# Patient Record
Sex: FEMALE | Race: BLACK OR AFRICAN AMERICAN
[De-identification: names, ages, dates, MRNs, and addresses within clinical notes are randomized per-mention and may not be internally consistent; named-entity substitution may affect disease eponyms.]

---

## 2017-01-17 ENCOUNTER — HOSPITAL ENCOUNTER (OUTPATIENT)
Dept: HOSPITAL 45 - C.LABSPEC | Age: 32
Discharge: HOME | End: 2017-01-17
Attending: OBSTETRICS & GYNECOLOGY
Payer: COMMERCIAL

## 2017-01-17 DIAGNOSIS — N92.5: ICD-10-CM

## 2017-01-17 DIAGNOSIS — R10.2: Primary | ICD-10-CM

## 2017-01-21 LAB
CHLAMYDIA TRACH RNA***: NOT DETECTED
GC (NEIS GONORRHOEAE)RNA**: NOT DETECTED

## 2017-09-25 ENCOUNTER — HOSPITAL ENCOUNTER (OUTPATIENT)
Dept: HOSPITAL 45 - C.EDA | Age: 32
Setting detail: OBSERVATION
LOS: 1 days | Discharge: HOME | End: 2017-09-26
Attending: SURGERY | Admitting: SURGERY
Payer: COMMERCIAL

## 2017-09-25 VITALS
OXYGEN SATURATION: 98 % | TEMPERATURE: 98.78 F | HEART RATE: 84 BPM | SYSTOLIC BLOOD PRESSURE: 106 MMHG | DIASTOLIC BLOOD PRESSURE: 66 MMHG

## 2017-09-25 VITALS
OXYGEN SATURATION: 100 % | TEMPERATURE: 99.14 F | SYSTOLIC BLOOD PRESSURE: 111 MMHG | DIASTOLIC BLOOD PRESSURE: 74 MMHG | HEART RATE: 61 BPM

## 2017-09-25 VITALS
HEIGHT: 69 IN | HEIGHT: 69 IN | BODY MASS INDEX: 28.6 KG/M2 | BODY MASS INDEX: 28.6 KG/M2 | WEIGHT: 193.12 LBS | WEIGHT: 193.12 LBS

## 2017-09-25 VITALS — HEART RATE: 50 BPM | OXYGEN SATURATION: 93 % | SYSTOLIC BLOOD PRESSURE: 137 MMHG | DIASTOLIC BLOOD PRESSURE: 89 MMHG

## 2017-09-25 VITALS — OXYGEN SATURATION: 99 % | SYSTOLIC BLOOD PRESSURE: 128 MMHG | HEART RATE: 75 BPM | DIASTOLIC BLOOD PRESSURE: 77 MMHG

## 2017-09-25 VITALS
TEMPERATURE: 97.7 F | OXYGEN SATURATION: 100 % | DIASTOLIC BLOOD PRESSURE: 76 MMHG | SYSTOLIC BLOOD PRESSURE: 123 MMHG | HEART RATE: 61 BPM

## 2017-09-25 VITALS — SYSTOLIC BLOOD PRESSURE: 119 MMHG | DIASTOLIC BLOOD PRESSURE: 80 MMHG | OXYGEN SATURATION: 96 % | HEART RATE: 56 BPM

## 2017-09-25 VITALS
SYSTOLIC BLOOD PRESSURE: 128 MMHG | TEMPERATURE: 97.52 F | DIASTOLIC BLOOD PRESSURE: 84 MMHG | HEART RATE: 53 BPM | OXYGEN SATURATION: 100 %

## 2017-09-25 VITALS — OXYGEN SATURATION: 100 %

## 2017-09-25 DIAGNOSIS — Z82.49: ICD-10-CM

## 2017-09-25 DIAGNOSIS — K35.80: Primary | ICD-10-CM

## 2017-09-25 DIAGNOSIS — Z83.3: ICD-10-CM

## 2017-09-25 DIAGNOSIS — F90.9: ICD-10-CM

## 2017-09-25 DIAGNOSIS — F17.200: ICD-10-CM

## 2017-09-25 LAB
ALP SERPL-CCNC: 76 U/L (ref 45–117)
ALT SERPL-CCNC: 23 U/L (ref 12–78)
ANION GAP SERPL CALC-SCNC: 10 MMOL/L (ref 3–11)
APPEARANCE UR: CLEAR
AST SERPL-CCNC: 26 U/L (ref 15–37)
BASOPHILS # BLD: 0.01 K/UL (ref 0–0.2)
BASOPHILS NFR BLD: 0.1 %
BILIRUB UR-MCNC: (no result) MG/DL
BUN SERPL-MCNC: 16 MG/DL (ref 7–18)
BUN/CREAT SERPL: 19.5 (ref 10–20)
CALCIUM SERPL-MCNC: 9 MG/DL (ref 8.5–10.1)
CHLORIDE SERPL-SCNC: 107 MMOL/L (ref 98–107)
CO2 SERPL-SCNC: 21 MMOL/L (ref 21–32)
COLOR UR: YELLOW
COMPLETE: YES
CREAT CL PREDICTED SERPL C-G-VRATE: 115.6 ML/MIN
CREAT SERPL-MCNC: 0.84 MG/DL (ref 0.6–1.2)
EOSINOPHIL NFR BLD AUTO: 256 K/UL (ref 130–400)
GLUCOSE SERPL-MCNC: 93 MG/DL (ref 70–99)
HCT VFR BLD CALC: 37.1 % (ref 37–47)
IG%: 0.2 %
IMM GRANULOCYTES NFR BLD AUTO: 23 %
LYMPHOCYTES # BLD: 2.11 K/UL (ref 1.2–3.4)
MANUAL MICROSCOPIC REQUIRED?: NO
MCH RBC QN AUTO: 29.9 PG (ref 25–34)
MCHC RBC AUTO-ENTMCNC: 33.4 G/DL (ref 32–36)
MCV RBC AUTO: 89.4 FL (ref 80–100)
MONOCYTES NFR BLD: 5.6 %
NEUTROPHILS # BLD AUTO: 0.7 %
NEUTROPHILS NFR BLD AUTO: 70.4 %
NITRITE UR QL STRIP: (no result)
PH UR STRIP: 6.5 [PH] (ref 4.5–7.5)
PMV BLD AUTO: 10.2 FL (ref 7.4–10.4)
POTASSIUM SERPL-SCNC: 3.7 MMOL/L (ref 3.5–5.1)
PREG INTERNAL NEGATIVE QC: (no result)
PREG INTERNAL POSITIVE QC: (no result)
RBC # BLD AUTO: 4.15 M/UL (ref 4.2–5.4)
REVIEW REQ?: YES
SODIUM SERPL-SCNC: 138 MMOL/L (ref 136–145)
SP GR UR STRIP: 1.01 (ref 1–1.03)
URINE BILL WITH OR WITHOUT MIC: (no result)
URINE EPITHELIAL CELL AUTO: >30 /LPF (ref 0–5)
UROBILINOGEN UR-MCNC: (no result) MG/DL
WBC # BLD AUTO: 9.16 K/UL (ref 4.8–10.8)
ZZUR CULT IF INDIC CLEAN CATCH: YES

## 2017-09-25 RX ADMIN — OXYCODONE HYDROCHLORIDE AND ACETAMINOPHEN PRN TAB: 5; 325 TABLET ORAL at 19:39

## 2017-09-25 RX ADMIN — DEXTROSE MONOHYDRATE, SODIUM CHLORIDE, AND POTASSIUM CHLORIDE SCH MLS/HR: 50; 4.5; 1.49 INJECTION, SOLUTION INTRAVENOUS at 14:27

## 2017-09-25 RX ADMIN — OXYCODONE HYDROCHLORIDE AND ACETAMINOPHEN PRN TAB: 5; 325 TABLET ORAL at 14:31

## 2017-09-25 RX ADMIN — DEXTROSE MONOHYDRATE, SODIUM CHLORIDE, AND POTASSIUM CHLORIDE SCH MLS/HR: 50; 4.5; 1.49 INJECTION, SOLUTION INTRAVENOUS at 23:23

## 2017-09-25 NOTE — [facility] POST OPERATIVE BRIEF NOTE
Immediate Operative Summary


Operative Date


Sep 25, 2017.





Pre-Operative Diagnosis





Acute Appendicitis





Post-Operative Diagnosis





Acute Appendicitis





Procedure(s) Performed





Laparoscopic Appendectomy





Surgeon


Dr. Jameson





Assistant Surgeon(s)


Aleksandra Moura PA-C





Estimated Blood Loss


5 ml





Findings


See dictation





Specimens





A: Appendix





Drains


None





Anesthesia


General





Complication(s)


None





Disposition


Recovery Room / PACU

## 2017-09-25 NOTE — OPERATIVE REPORT
DATE OF OPERATION:  09/25/2017

 

PREOPERATIVE DIAGNOSIS:  Acute appendicitis.

 

POSTOPERATIVE DIAGNOSIS:  Same.

 

PROCEDURE:  Laparoscopic appendectomy.

 

SURGEON:  Dr. Jameson.

 

ASSISTANT:  Aleksandra Moura PA-C.

 

FINDINGS:  The appendix was longer than average.  The distal one-half was

hyperemic and firm and dilated.  It was attached to the lateral and

posterolateral abdominal wall via flimsy adhesions.  The proximal one-half of

the appendix, especially the appendix at the base and the cecum at the base

of the appendix were normal.  There was no evidence of perforation or

abscess.  The uterus appeared mildly dilated.  There was no fluid in the

pelvis.  The visible bowel appeared normal.

 

TECHNIQUE:  The patient was given a general anesthetic and the area was

prepped and draped in usual sterile fashion.  Transverse incision was made

below the umbilicus, carried down through the subcutaneous tissue to the

fascia which was grasped with 2 Kocher clamps and incised between.  The

peritoneum was identified, incised, and the introducer was placed bluntly. 

The abdomen was then insufflated to a pressure of 15 mmHg with carbon

dioxide.  A lower midline introducer was placed under direct vision through

small skin incision.  Traction was placed superomedially on the cecum and the

appendix was easily identified.  The left lower quadrant introducer was

placed under direct vision through small skin incision.  Traction was placed

anteriorly on the appendix and the adhesions to the posterior and

posterolateral abdominal walls were taken down using a blunt cautery

dissection where appropriate, which allowed me to elevate the appendix

completely.  I was then able to establish a plane between the mesoappendix

and the base of the appendix.  The mesoappendix was divided using the

Endo-HAO stapler.  The appendix was then amputated also with the Endo-HAO

stapler.  The appendix was placed into an Endobag and brought out through the

left lower quadrant introducer site.  The right lower quadrant was irrigated

after that introducer was replaced.  The staple line on the ____ area of

minimal oozing was controlled with minimal cautery.  The right lower quadrant

was irrigated and irrigation removed.  Any irrigation that entered the right

upper quadrant was also removed and any irrigation in the pelvis was removed.

 The staple lines were again inspected.  There was no bleeding.  The gas was

allowed to escape and the introducer was removed.  Fascia of the umbilical

introducer site was closed with interrupted 0 Vicryl and skin of all the

incisions was closed with 4-0 Monocryl in either an interrupted or running

subcuticular fashion.  The skin was cleansed, dried, benzoin placed,

Steri-Strips applied.  The estimated blood loss was 5 mL  Sponge, needle and

instrument counts were correct prior to closure.  The patient tolerated the

surgical procedure without complication and was transferred to recovery.

 

 

I attest to the content of the Intraoperative Record and any orders documented therein. Any exception
s are noted below.

## 2017-09-25 NOTE — ANESTHESIOLOGY PROGRESS NOTE
Anesthesia Post Op Note


Date & Time


Sep 25, 2017 at 13:17





Vital Signs





Vital Signs Past 12 Hours








  Date Time  Temp Pulse Resp B/P (MAP) Pulse Ox O2 Delivery O2 Flow Rate FiO2


 


9/25/17 13:13  50 16 137/89 (105) 93 Room Air  


 


9/25/17 12:40  56 20 119/80 (93) 96 Room Air  


 


9/25/17 12:10     100 Nasal Cannula 2.0 


 


9/25/17 12:10 36.4 53 20 128/84 (99) 100 Nasal Cannula 2.0 


 


9/25/17 12:10     100 Nasal Cannula 2.0 


 


9/25/17 12:00  48 12 145/93 100 Nasal Cannula 4 


 


9/25/17 11:51    134/98    


 


9/25/17 11:50 36.0 47 13 134/98 100 Nasal Cannula 4 


 


9/25/17 11:48  48 13  100   


 


9/25/17 11:48  48 13     


 


9/25/17 11:46    136/83    


 


9/25/17 11:43  49 13     


 


9/25/17 11:43  49 13  100   


 


9/25/17 11:41    123/74    


 


9/25/17 11:38  48 13     


 


9/25/17 11:38  48 13  100   


 


9/25/17 11:36    128/84    


 


9/25/17 11:33  51 15  100   


 


9/25/17 11:33  51 15     


 


9/25/17 11:31    119/82    


 


9/25/17 11:28  49 14  100   


 


9/25/17 11:28  49 14     


 


9/25/17 11:26    119/79    


 


9/25/17 11:23  51 16     


 


9/25/17 11:23  52 16  100   


 


9/25/17 11:21    121/87    


 


9/25/17 11:18  51 17  100   


 


9/25/17 11:18  51 17     


 


9/25/17 11:16    121/81    


 


9/25/17 11:14    122/73    


 


9/25/17 11:13 36.0 62 16 122/73 100 Nasal Cannula 4 


 


9/25/17 11:13  70 23     


 


9/25/17 11:13  70 23  89   


 


9/25/17 09:08  59 16 98/66 96 Room Air  


 


9/25/17 08:41        


 


9/25/17 08:40     100 Room Air  


 


9/25/17 08:00  55 16 111/68 100 Room Air  


 


9/25/17 07:04  70      


 


9/25/17 07:03 37.0 69 26 145/87 100 Room Air  











Notes


Mental Status:  alert / awake / arousable, participated in evaluation


Pt Amnestic to Procedure:  Yes


Nausea / Vomiting:  adequately controlled


Pain:  adequately controlled


Airway Patency, RR, SpO2:  stable & adequate


BP & HR:  stable & adequate


Hydration State:  stable & adequate


Anesthetic Complications:  no major complications apparent

## 2017-09-25 NOTE — DIAGNOSTIC IMAGING REPORT
ABD/PELVIS WITHOUT FOR STONE



HISTORY:  31 years-old Female right flank pain acute right-sided flank pain with

nausea and vomiting. Initial exam.



COMPARISON: Pelvic ultrasound 11/1/2016



TECHNIQUE: Multiple axial CT images of the abdomen and pelvis were obtained

without contrast utilizing kidney stone protocol. A dose lowering technique was

used consistent with the principals of KELSEY.



FINDINGS: 

Lung bases are generally clear. No pneumoperitoneum. Imaged inferior cardiac

chambers are unremarkable.



The liver, spleen, pancreas, gallbladder and adrenal glands are unremarkable.



There is increased attenuation of the medullary portions of the kidneys

bilaterally. No renal calculi or hydronephrosis identified. Urinary bladder,

uterus and adnexa are within normal limits. There is trace free pelvic fluid.



The abdominal aorta is normal in both course and caliber. No bulky adenopathy

identified.



There is no bowel obstruction. The appendix is dilated measuring up to 9 mm with

mild to moderate degree of surrounding inflammatory stranding compatible with

acute appendicitis. No evidence of perforation or abscess. Trace free pelvic

fluid is likely reactive.



Soft tissues are unremarkable. Bones are intact. Sclerotic focus of the right

femoral head suggests bone island.



IMPRESSION: 

1. Findings compatible with acute uncomplicated appendicitis.

2. No renal calculi or hydronephrosis. 

3. Increased attenuation of the medullary kidneys bilaterally is nonspecific and

may reflect sequela of dehydration. Medullary nephrocalcinosis is thought to be

less likely.



These findings were discussed with Dr. Da Silva on 9/25/2017 at 7:50 AM





The above report was generated using voice recognition software. It may contain

grammatical, syntax or spelling errors.







Electronically signed by:  Royal Baird M.D.

9/25/2017 7:57 AM



Dictated Date/Time:  9/25/2017 7:48 AM

## 2017-09-25 NOTE — HISTORY AND PHYSICAL
History & Physical


Date & Time of Service:


Sep 25, 2017 at 08:51


Chief Complaint:


Abdominal Pain


Primary Care Physician:


No Doctor, Assigned


History of Present Illness


Source:  patient


This is a 31-year-old female who presented to the emergency room with a 

complaint of pain centered mostly now in the right lower quadrant.  She stated 

that yesterday she had some mild discomfort that was more of a crampy 

sensation.  She has lactose intolerance and develops that on occasion.  This 

did not feel any different than what she normally experiences.  She did have 

some nausea.  She had no vomiting until this morning.  She denies fever and 

chills.  She went to bed last night was able to sleep however awoke at 4:30 

this morning with more sharp pain that was now centered in the right lower 

quadrant that then radiated around to her back.  She has not had any diarrhea 

or constipation.  She denies melena and hematochezia.  She's had no hematuria.  

She does not think that she has had fever.





Past Medical/Surgical History


Medical Problems:


(1) ACUTE BRONCHITIS


Status: Resolved  





(2) ATTN DEFICIT W HYPERACT


Status: Chronic  





(3) FAM HX-DIABETES MELLITUS


Status: Chronic  





(4) OVARIAN CYST NEC/NOS


Status: Chronic  





(5) TOBACCO USE DISORDER


Status: Chronic  











Family History





Diabetes mellitus


Hypertension





Social History


Smoking Status:  Current Every Day Smoker (one pack per day)


Smokeless Tobacco Use:  No


Alcohol Use:  socially


Marital Status:  single


Housing status:  lives with family


Occupational Status:  employed





Immunizations


History of Influenza Vaccine:  Unknown


History of Tetanus Vaccine?:  Unknown


History of Pneumococcal:  Unknown


History of Hepatitis B Vaccine:  Unknown





Multi-Drug Resistant Organisms


History of MDRO:  No





Allergies


Coded Allergies:  


     NO KNOWN DRUG ALLERGIES (Verified  Allergy, Unknown, ., 9/25/17)


     Lactose (Verified  Adverse Reaction, Mild, GI SYMPTOMS, 9/25/17)





Home Medications


No Active Prescriptions or Reported Meds





Review of Systems


Constitutional:  + fever


Respiratory:  No cough, No sputum


Cardiovascular:  No chest pain, No orthopnea


Abdomen:  + problem reported (As per HPI)


Genitourinary - Female:  + problem reported (as per HPI)


Endocrine:  No fatigue


Integumentary:  No rash





Physical Exam


Vital Signs











  Date Time  Temp Pulse Resp B/P (MAP) Pulse Ox O2 Delivery O2 Flow Rate FiO2


 


9/25/17 08:41        


 


9/25/17 08:00  55 16 111/68 100 Room Air  


 


9/25/17 07:04  70      


 


9/25/17 07:03 37.0 69 26 145/87 100 Room Air  








General Appearance:  WD/WN, no apparent distress


Head:  normocephalic


Neck:  supple


Respiratory/Chest:  chest non-tender, lungs clear


Cardiovascular:  regular rate, rhythm


Abdomen/GI:  normal bowel sounds, soft, + tenderness (RLQ)


Back:  normal inspection, no CVA tenderness


Extremities/Musculoskelatal:  normal inspection





Diagnostics


Laboratory Results





Results Past 24 Hours








Test


  9/25/17


06:33 Range/Units


 


 


White Blood Count 9.16 4.8-10.8  K/uL


 


Red Blood Count 4.15 4.2-5.4  M/uL


 


Hemoglobin 12.4 12.0-16.0  g/dL


 


Hematocrit 37.1 37-47  %


 


Mean Corpuscular Volume 89.4   fL


 


Mean Corpuscular Hemoglobin 29.9 25-34  pg


 


Mean Corpuscular Hemoglobin


Concent 33.4


  32-36  g/dl


 


 


Platelet Count 256 130-400  K/uL


 


Mean Platelet Volume 10.2 7.4-10.4  fL


 


Neutrophils (%) (Auto) 70.4  %


 


Lymphocytes (%) (Auto) 23.0  %


 


Monocytes (%) (Auto) 5.6  %


 


Eosinophils (%) (Auto) 0.7  %


 


Basophils (%) (Auto) 0.1  %


 


Neutrophils # (Auto) 6.45 1.4-6.5  K/uL


 


Lymphocytes # (Auto) 2.11 1.2-3.4  K/uL


 


Monocytes # (Auto) 0.51 0.11-0.59  K/uL


 


Eosinophils # (Auto) 0.06 0-0.5  K/uL


 


Basophils # (Auto) 0.01 0-0.2  K/uL


 


RDW Standard Deviation 39.6 36.4-46.3  fL


 


RDW Coefficient of Variation 12.2 11.5-14.5  %


 


Immature Granulocyte % (Auto) 0.2  %


 


Immature Granulocyte # (Auto) 0.02 0.00-0.02  K/uL


 


Sodium Level 138 136-145  mmol/L


 


Potassium Level 3.7 3.5-5.1  mmol/L


 


Chloride Level 107   mmol/L


 


Carbon Dioxide Level 21 21-32  mmol/L


 


Anion Gap 10.0 3-11  mmol/L


 


Blood Urea Nitrogen 16 7-18  mg/dl


 


Creatinine


  0.84


  0.60-1.20


mg/dl


 


Est Creatinine Clear Calc


Drug Dose 115.6


   ml/min


 


 


Estimated GFR (


American) 108.1


   


 


 


Estimated GFR (Non-


American 93.3


   


 


 


BUN/Creatinine Ratio 19.5 10-20  


 


Random Glucose 93 70-99  mg/dl


 


Calcium Level 9.0 8.5-10.1  mg/dl


 


Total Bilirubin 0.4 0.2-1  mg/dl


 


Direct Bilirubin < 0.1 0-0.2  mg/dl


 


Aspartate Amino Transf


(AST/SGOT) 26


  15-37  U/L


 


 


Alanine Aminotransferase


(ALT/SGPT) 23


  12-78  U/L


 


 


Alkaline Phosphatase 76   U/L


 


Total Protein 8.0 6.4-8.2  gm/dl


 


Albumin 3.6 3.4-5.0  gm/dl


 


Lipase 94   U/L


 


Human Chorionic Gonadotropin,


Qual NEG


  NEG  


 











Diagnostic Radiology


FINDINGS: 


Lung bases are generally clear. No pneumoperitoneum. Imaged inferior cardiac


chambers are unremarkable.





The liver, spleen, pancreas, gallbladder and adrenal glands are unremarkable.





There is increased attenuation of the medullary portions of the kidneys


bilaterally. No renal calculi or hydronephrosis identified. Urinary bladder,


uterus and adnexa are within normal limits. There is trace free pelvic fluid.





The abdominal aorta is normal in both course and caliber. No bulky adenopathy


identified.





There is no bowel obstruction. The appendix is dilated measuring up to 9 mm with


mild to moderate degree of surrounding inflammatory stranding compatible with


acute appendicitis. No evidence of perforation or abscess. Trace free pelvic


fluid is likely reactive.





Soft tissues are unremarkable. Bones are intact. Sclerotic focus of the right


femoral head suggests bone island.





IMPRESSION: 


1. Findings compatible with acute uncomplicated appendicitis.


2. No renal calculi or hydronephrosis. 


3. Increased attenuation of the medullary kidneys bilaterally is nonspecific and


may reflect sequela of dehydration. Medullary nephrocalcinosis is thought to be


less likely.





These findings were discussed with Dr. Da Silva on 9/25/2017 at 7:50 AM








The above report was generated using voice recognition software. It may contain


grammatical, syntax or spelling errors.





Impression


Assessment and Plan


This patient's history, physical findings and CT findings are consistent with 

appendicitis.  I have recommended a laparoscopic appendectomy.  I explained the 

possible need to convert to an open procedure.  I explained the possible 

complications associated with those procedures.  The patient wishes to go ahead 

with surgery and has signed a consent form.

## 2017-09-25 NOTE — EMERGENCY ROOM VISIT NOTE
History


Report prepared by Debby:  Janelle Velarde


Under the Supervision of:  Dr. Aram Kern M.D.


First contact with patient:  06:57


Chief Complaint:  ABDOMINAL PAIN


Stated Complaint:  ABDOMINAL PAIN





History of Present Illness


The patient is a 31 year old female who presents to the Emergency Room with 

complaints of persistent right upper quadrant abdominal pain that began around 

0430 this morning.  She currently rates her discomfort as a 9/10 in severity.  

The patient reports that she woke this morning at 0430 this morning in the 

worst pain in her life.  She states that her pain today is worse than child 

birth.  The patient denies ever having pain like this in the past.  She states 

that she thought she needed to have a bowel movement, but states that she could 

not.  The patient states that she then became nauseous and began vomiting.  She 

additionally reports right sided back pain.  The patient reports normal 

menstrual cycles.  She reports a history of a tubal ligation.  The patient 

states that she recently had a slight cough.  She denies any abnormal bowel 

movements over the past several days.  The patient denies any family history of 

kidney stones or gallbladder disease.  Per records, the patient received 4 mg 

of Zofran and 4 mg of Morphine en-route to the emergency department which 

helped to alleviate her pain slightly.  Pt denies LOC, headache, fevers, chills

, diaphoresis, visual changes, neck pain, chest pain, breathing difficulties, 

melena, hematochezia, urinary symptoms, numbness, weakness, lymphadenopathy, 

rash, or other complaints.





   Source of History:  patient


   Onset:  0430 this morning


   Position:  abdomen (RUQ)


   Timing:  other (persistent)


   Associated Symptoms:  + cough, + nausea, + vomiting, + back pain





Review of Systems


See HPI for pertinent positives and negatives.  A total of ten systems were 

reviewed and were otherwise negative.





Past Medical & Surgical


Medical Problems:


(1) Active labor


(2) ACUTE BRONCHITIS


(3) Appendicitis


(4) ATTN DEFICIT W HYPERACT


(5) FAM HX-DIABETES MELLITUS


(6) OVARIAN CYST NEC/NOS


(7) TOBACCO USE DISORDER








Family History





Diabetes mellitus


Hypertension





Social History


Smoking Status:  Current Every Day Smoker


Alcohol Use:  none


Marital Status:  single


Housing Status:  lives with family


Occupation Status:  employed





Current/Historical Medications


No Active Prescriptions or Reported Meds





Allergies


Coded Allergies:  


     NO KNOWN DRUG ALLERGIES (Verified  Allergy, Unknown, ., 9/25/17)


     Lactose (Verified  Adverse Reaction, Mild, GI SYMPTOMS, 9/25/17)





Physical Exam


Vital Signs











  Date Time  Temp Pulse Resp B/P (MAP) Pulse Ox O2 Delivery O2 Flow Rate FiO2


 


9/25/17 12:10     100 Nasal Cannula 2.0 


 


9/25/17 12:10 36.4 53 20 128/84 (99) 100 Nasal Cannula 2.0 


 


9/25/17 12:10     100 Nasal Cannula 2.0 


 


9/25/17 12:00  48 12 145/93 100 Nasal Cannula 4 


 


9/25/17 11:51    134/98    


 


9/25/17 11:50 36.0 47 13 134/98 100 Nasal Cannula 4 


 


9/25/17 11:48  48 13  100   


 


9/25/17 11:48  48 13     


 


9/25/17 11:46    136/83    


 


9/25/17 11:43  49 13     


 


9/25/17 11:43  49 13  100   


 


9/25/17 11:41    123/74    


 


9/25/17 11:38  48 13     


 


9/25/17 11:38  48 13  100   


 


9/25/17 11:36    128/84    


 


9/25/17 11:33  51 15  100   


 


9/25/17 11:33  51 15     


 


9/25/17 11:31    119/82    


 


9/25/17 11:28  49 14  100   


 


9/25/17 11:28  49 14     


 


9/25/17 11:26    119/79    


 


9/25/17 11:23  51 16     


 


9/25/17 11:23  52 16  100   


 


9/25/17 11:21    121/87    


 


9/25/17 11:18  51 17  100   


 


9/25/17 11:18  51 17     


 


9/25/17 11:16    121/81    


 


9/25/17 11:14    122/73    


 


9/25/17 11:13 36.0 62 16 122/73 100 Nasal Cannula 4 


 


9/25/17 11:13  70 23     


 


9/25/17 11:13  70 23  89   


 


9/25/17 09:08  59 16 98/66 96 Room Air  


 


9/25/17 08:41        


 


9/25/17 08:40     100 Room Air  


 


9/25/17 08:00  55 16 111/68 100 Room Air  


 


9/25/17 07:04  70      


 


9/25/17 07:03 37.0 69 26 145/87 100 Room Air  











Physical Exam


GENERAL: Awake, alert, well-appearing, in no distress


HENT: Normocephalic, atraumatic. Oropharynx unremarkable.


EYES: Normal conjunctiva. Sclera non-icteric.


NECK: Supple. No nuchal rigidity. FROM. No JVD.


RESPIRATORY: Clear to auscultation.


CARDIAC: Regular rate, normal rhythm. Extremities warm and well perfused. 

Pulses equal.


ABDOMEN: Soft, non-distended. Right lower quadrant, right flank and suprapubic 

tenderness to palpation. No rebound or guarding. No masses.


RECTAL: Deferred.


MUSCULOSKELETAL: Chest examination reveals no tenderness. The back is 

symmetrical on inspection without obvious abnormality. There is no CVA 

tenderness to palpation. No joint edema. 


LOWER EXTREMITIES: Calves are equal size bilaterally and non-tender. No edema. 

No discoloration. 


NEURO: Normal sensorium. No sensory or motor deficits noted. 


SKIN: No rash or jaundice noted.





Medical Decision & Procedures


ER Provider


Diagnostic Interpretation:


CT: Radiology results as stated below per my review and radiologist 

interpretation





ABD/PELVIS WITHOUT FOR STONE





HISTORY:  31 years-old Female right flank pain acute right-sided flank pain with


nausea and vomiting. Initial exam.





COMPARISON: Pelvic ultrasound 11/1/2016





TECHNIQUE: Multiple axial CT images of the abdomen and pelvis were obtained


without contrast utilizing kidney stone protocol. A dose lowering technique was


used consistent with the principals of ALARA.





FINDINGS: 


Lung bases are generally clear. No pneumoperitoneum. Imaged inferior cardiac


chambers are unremarkable.





The liver, spleen, pancreas, gallbladder and adrenal glands are unremarkable.





There is increased attenuation of the medullary portions of the kidneys


bilaterally. No renal calculi or hydronephrosis identified. Urinary bladder,


uterus and adnexa are within normal limits. There is trace free pelvic fluid.





The abdominal aorta is normal in both course and caliber. No bulky adenopathy


identified.





There is no bowel obstruction. The appendix is dilated measuring up to 9 mm with


mild to moderate degree of surrounding inflammatory stranding compatible with


acute appendicitis. No evidence of perforation or abscess. Trace free pelvic


fluid is likely reactive.





Soft tissues are unremarkable. Bones are intact. Sclerotic focus of the right


femoral head suggests bone island.





IMPRESSION: 


1. Findings compatible with acute uncomplicated appendicitis.


2. No renal calculi or hydronephrosis. 


3. Increased attenuation of the medullary kidneys bilaterally is nonspecific and


may reflect sequela of dehydration. Medullary nephrocalcinosis is thought to be


less likely.





These findings were discussed with Dr. Da Silva on 9/25/2017 at 7:50 AM





The above report was generated using voice recognition software. It may contain


grammatical, syntax or spelling errors.





Electronically signed by:  Royal Baird M.D.


9/25/2017 7:57 AM





Dictated Date/Time:  9/25/2017 7:48 AM





Laboratory Results


9/25/17 06:33








Red Blood Count 4.15, Mean Corpuscular Volume 89.4, Mean Corpuscular Hemoglobin 

29.9, Mean Corpuscular Hemoglobin Concent 33.4, Mean Platelet Volume 10.2, 

Neutrophils (%) (Auto) 70.4, Lymphocytes (%) (Auto) 23.0, Monocytes (%) (Auto) 

5.6, Eosinophils (%) (Auto) 0.7, Basophils (%) (Auto) 0.1, Neutrophils # (Auto) 

6.45, Lymphocytes # (Auto) 2.11, Monocytes # (Auto) 0.51, Eosinophils # (Auto) 

0.06, Basophils # (Auto) 0.01





9/25/17 06:33

















Test


  9/25/17


06:33


 


White Blood Count


  9.16 K/uL


(4.8-10.8)


 


Red Blood Count


  4.15 M/uL


(4.2-5.4)


 


Hemoglobin


  12.4 g/dL


(12.0-16.0)


 


Hematocrit 37.1 % (37-47) 


 


Mean Corpuscular Volume


  89.4 fL


()


 


Mean Corpuscular Hemoglobin


  29.9 pg


(25-34)


 


Mean Corpuscular Hemoglobin


Concent 33.4 g/dl


(32-36)


 


Platelet Count


  256 K/uL


(130-400)


 


Mean Platelet Volume


  10.2 fL


(7.4-10.4)


 


Neutrophils (%) (Auto) 70.4 % 


 


Lymphocytes (%) (Auto) 23.0 % 


 


Monocytes (%) (Auto) 5.6 % 


 


Eosinophils (%) (Auto) 0.7 % 


 


Basophils (%) (Auto) 0.1 % 


 


Neutrophils # (Auto)


  6.45 K/uL


(1.4-6.5)


 


Lymphocytes # (Auto)


  2.11 K/uL


(1.2-3.4)


 


Monocytes # (Auto)


  0.51 K/uL


(0.11-0.59)


 


Eosinophils # (Auto)


  0.06 K/uL


(0-0.5)


 


Basophils # (Auto)


  0.01 K/uL


(0-0.2)


 


RDW Standard Deviation


  39.6 fL


(36.4-46.3)


 


RDW Coefficient of Variation


  12.2 %


(11.5-14.5)


 


Immature Granulocyte % (Auto) 0.2 % 


 


Immature Granulocyte # (Auto)


  0.02 K/uL


(0.00-0.02)


 


Anion Gap


  10.0 mmol/L


(3-11)


 


Est Creatinine Clear Calc


Drug Dose 115.6 ml/min 


 


 


Estimated GFR (


American) 108.1 


 


 


Estimated GFR (Non-


American 93.3 


 


 


BUN/Creatinine Ratio 19.5 (10-20) 


 


Calcium Level


  9.0 mg/dl


(8.5-10.1)


 


Total Bilirubin


  0.4 mg/dl


(0.2-1)


 


Direct Bilirubin


  < 0.1 mg/dl


(0-0.2)


 


Aspartate Amino Transf


(AST/SGOT) 26 U/L (15-37) 


 


 


Alanine Aminotransferase


(ALT/SGPT) 23 U/L (12-78) 


 


 


Alkaline Phosphatase


  76 U/L


()


 


Total Protein


  8.0 gm/dl


(6.4-8.2)


 


Albumin


  3.6 gm/dl


(3.4-5.0)


 


Lipase


  94 U/L


()


 


Human Chorionic Gonadotropin,


Qual NEG (NEG) 


 








Laboratory results reviewed by me





Medications Administered











 Medications


  (Trade)  Dose


 Ordered  Sig/Thomas


 Route  Start Time


 Stop Time Status Last Admin


Dose Admin


 


 Sodium Chloride  1,000 ml @ 


 125 mls/hr  Q8H STAT


 IV  9/25/17 07:01


 9/25/17 15:00 DC 9/25/17 07:01


125 MLS/HR


 


 Ondansetron HCl


  (Zofran Inj)  4 mg  NOW  STAT


 IV  9/25/17 07:16


 9/25/17 07:18 DC 9/25/17 07:28


4 MG


 


 Hydromorphone HCl


  (Dilaudid Inj)  0.5 mg  NOW  STAT


 IV  9/25/17 07:16


 9/25/17 07:18 DC 9/25/17 07:28


0.5 MG


 


 Ketorolac


 Tromethamine


  (Toradol Inj)  30 mg  NOW  STAT


 IV  9/25/17 07:16


 9/25/17 07:18 DC 9/25/17 07:27


30 MG


 


 Hydromorphone HCl


  (Dilaudid Inj)  1 mg  Q15M  PRN


 IV  9/25/17 08:00


 10/9/17 07:59  9/25/17 08:07


1 MG


 


 Sodium Chloride  1,000 ml @ 


 999 mls/hr  Q1H1M STAT


 IV  9/25/17 07:54


 9/25/17 08:54 DC 9/25/17 07:54


999 MLS/HR


 


 Cefoxitin Sodium


 2000 mg/Dextrose  60 ml @ 


 120 mls/hr  PREOP


 IV  9/25/17 06:00


 9/26/17 05:59  9/25/17 08:07


120 MLS/HR


 


 Cefazolin Sodium


  (Ancef Inj)  1,000 mg  STK-MED ONCE


 .ROUTE  9/25/17 09:13


 9/25/17 09:14 DC 9/25/17 10:50


1,000 MG


 


 Heparin Sodium


  (Porcine)


  (Heparin Iv


 Bolus)  10,000 unit  STK-MED ONCE


 .ROUTE  9/25/17 09:13


 9/25/17 09:14 DC 9/25/17 10:51


5,000 UNIT


 


 Bupivacaine HCl


  (Marcaine 0.5%


 MPF Inj)  30 ml  STK-MED ONCE


 .ROUTE  9/25/17 09:13


 9/25/17 09:14 DC 9/25/17 10:50


20 ML


 


 Oxycodone/


 Acetaminophen


  (Percocet


 5-325mg Tab)  1 tab  Q4H  PRN


 PO  9/25/17 11:15


 10/9/17 11:14  9/25/17 14:31


1 TAB











ED Course


0701: Ordered Sodium Chloride 1000 ml @ 125 mls/hr IV.





0706: The patient was evaluated in room A11B. A complete history and physical 

exam was performed by the medical student.





0714: The patient was evaluated in room A11B. A complete history and physical 

exam was performed.





0716: Ordered Toradol Inj 30 mg IV, Dilaudid Inj 0.5 mg IV, Zofran Inj 4 mg IV.





0751: I discussed the patients radiology report with Dr. Baird, 

Radiology.  He states that the patient has acute appendicitis.





0754: Ordered Sodium Chloride 1000 ml @ 999 mls/hr IV.





0755: I reevaluated the patient and she is resting.  I discussed the exam 

findings with her and I discussed the treatment plan.  She verbalized complete 

understanding and agreement.  She will be evaluated for further treatment.





0800: Ordered Dilaudid Inj 1 mg IV.





0815: I discussed the patients case with Dr. Jameson, General Surgery.  He 

is going to evaluate the patient for further treatment.





Medical Decision


Medication Reconciliation: I attest that I have personally reviewed the patient'

s current medication list





Patient was found to have a slightly elevated blood pressure due to 

circumstances. I do not believe that the patient requires hypertension 

monitoring.





Triage Nursing notes reviewed.


The patient's presentation and history were concerning for abdominal pain.





EtiOlogies such as appendicitis, diverticulitis,  obstruction, inflammatory 

bowel disease, renal colic, PUD, biliary pathology, pancreatitis, mesenteric 

ischemia, aortic pathology, infections, genitourinary, UTI, perforated viscus, 

ovarian pathology, pregnancy, as well as others were entertained.   





  


 the patient was evaluated.  She was uncomfortable.  She was treated with IV 

Dilaudid, IV Toradol, normal saline, and Zofran.  On reassessment she was doing 

much better.  Her CBC, chemistry panel, and LFTs were unremarkable.  Urinalysis 

was pending.  Pregnancy test was negative.  The patient underwent CT imaging 

and this revealed the patient has acute appendicitis.  She was given additional 

IV fluids and Mefoxin.  Consultation was placed with general surgery.  The 

patient was evaluated in the Emergency Room for further management.





Consults


Time Called:  0800


Consulting Physician:  Dr. Jameson, General Surgery


Returned Call:  0815


I discussed the patients case with Dr. Jameson, General Surgery.  He is 

going to evaluate the patient for further treatment.





Impression





 Primary Impression:  


 Acute appendicitis





Scribe Attestation


The scribe's documentation has been prepared under my direction and personally 

reviewed by me in its entirety. I confirm that the note above accurately 

reflects all work, treatment, procedures, and medical decision making performed 

by me.





Departure Information


Dispostion


Being Evaluated By Surgeon





Prescriptions





No Active Prescriptions or Reported Meds





Referrals


Jeffrey Bailon M.D. (PCP)

## 2017-09-26 VITALS
SYSTOLIC BLOOD PRESSURE: 142 MMHG | OXYGEN SATURATION: 97 % | HEART RATE: 61 BPM | DIASTOLIC BLOOD PRESSURE: 77 MMHG | TEMPERATURE: 97.7 F

## 2017-09-26 VITALS
TEMPERATURE: 98.42 F | HEART RATE: 77 BPM | SYSTOLIC BLOOD PRESSURE: 127 MMHG | OXYGEN SATURATION: 98 % | DIASTOLIC BLOOD PRESSURE: 79 MMHG

## 2017-09-26 VITALS
HEART RATE: 61 BPM | TEMPERATURE: 97.7 F | DIASTOLIC BLOOD PRESSURE: 77 MMHG | OXYGEN SATURATION: 97 % | SYSTOLIC BLOOD PRESSURE: 142 MMHG

## 2017-09-26 VITALS
TEMPERATURE: 98.42 F | SYSTOLIC BLOOD PRESSURE: 122 MMHG | OXYGEN SATURATION: 98 % | DIASTOLIC BLOOD PRESSURE: 72 MMHG | HEART RATE: 72 BPM

## 2017-09-26 RX ADMIN — OXYCODONE HYDROCHLORIDE AND ACETAMINOPHEN PRN TAB: 5; 325 TABLET ORAL at 07:19

## 2017-09-26 RX ADMIN — DEXTROSE MONOHYDRATE, SODIUM CHLORIDE, AND POTASSIUM CHLORIDE SCH MLS/HR: 50; 4.5; 1.49 INJECTION, SOLUTION INTRAVENOUS at 09:05

## 2017-09-26 NOTE — SURGERY PROGRESS NOTE
Surgery Progress Note


Date of Service


Sep 26, 2017.





Subjective


Post OP Day:  1


+ ambulating (to the restroom), + pain controlled, + diet, No feeling well, No 

bowel movement, No flatus, No nausea, No vomiting





Objective


Vital Signs:











  Date Time  Temp Pulse Resp B/P (MAP) Pulse Ox O2 Delivery O2 Flow Rate FiO2


 


9/26/17 11:23 36.5 61 16  97 Room Air  


 


9/26/17 11:16 36.5 61 16 142/77 (98) 97 Room Air  


 


9/26/17 07:15      Room Air  


 


9/26/17 07:13 36.9 72 16 122/72 (89) 98 Room Air  


 


9/26/17 03:08 36.9 77 16 127/79 (95) 98 Room Air  


 


9/25/17 23:30      Room Air  


 


9/25/17 23:05 37.1 84 16 106/66 (79) 98 Room Air  


 


9/25/17 19:30 37.3 61 18 111/74 (86) 100 Room Air  


 


9/25/17 15:10 36.5 61 18 123/76 (92) 100 Room Air  


 


9/25/17 14:30      Room Air  


 


9/25/17 14:15  75 14 128/77 (94) 99 Room Air  


 


9/25/17 13:13  50 16 137/89 (105) 93 Room Air  


 


9/25/17 12:40  56 20 119/80 (93) 96 Room Air  


 


9/25/17 12:10     100 Nasal Cannula 2.0 


 


9/25/17 12:10 36.4 53 20 128/84 (99) 100 Nasal Cannula 2.0 


 


9/25/17 12:10     100 Nasal Cannula 2.0 


 


9/25/17 12:00  48 12 145/93 100 Nasal Cannula 4 


 


9/25/17 11:51    134/98    


 


9/25/17 11:50 36.0 47 13 134/98 100 Nasal Cannula 4 


 


9/25/17 11:48  48 13  100   


 


9/25/17 11:48  48 13     


 


9/25/17 11:46    136/83    


 


9/25/17 11:43  49 13     


 


9/25/17 11:43  49 13  100   


 


9/25/17 11:41    123/74    


 


9/25/17 11:38  48 13     


 


9/25/17 11:38  48 13  100   


 


9/25/17 11:36    128/84    


 


9/25/17 11:33  51 15  100   


 


9/25/17 11:33  51 15     


 


9/25/17 11:31    119/82    


 


9/25/17 11:28  49 14  100   


 


9/25/17 11:28  49 14     








General Appearance:  WD/WN, no apparent distress


Head:  normocephalic, atraumatic


Neck:  trachea midline


Respiratory/Chest:  no respiratory distress, no accessory muscle use


Abdomen:  non distended, soft, + tenderness (appropriate post op)


Incision(s):  clean, dry, intact (dressing clean and dry, some drainage on 

umbilical dressing)


Laboratory Results:





Results Past 24 Hours








Test


  9/25/17


21:20 Range/Units


 


 


Urine Color YELLOW  


 


Urine Appearance CLEAR CLEAR  


 


Urine pH 6.5 4.5-7.5  


 


Urine Specific Gravity 1.007 1.000-1.030  


 


Urine Protein NEG NEG  


 


Urine Glucose (UA) NEG NEG  


 


Urine Ketones NEG NEG  


 


Urine Occult Blood 2+ NEG  


 


Urine Nitrite NEG NEG  


 


Urine Bilirubin NEG NEG  


 


Urine Urobilinogen NEG NEG  


 


Urine Leukocyte Esterase LARGE NEG  


 


Urine WBC (Auto) >30 0-5  /hpf


 


Urine RBC (Auto) 10-30 0-4  /hpf


 


Urine Hyaline Casts (Auto) 10-30 0-5  /lpf


 


Urine Epithelial Cells (Auto) >30 0-5  /lpf


 


Urine Bacteria (Auto) NEG NEG  


 


Urine Renal Epithelial Cells  0-5  /lpf








Microbiology Results


9/25/17 Urine Culture, Received


          Pending





Assessment & Plan


POD # 1 s/p lap appy


   -vitals stable


   - tolerating diet


   - urinating without difficulty


   - pain controlled with oral meds





Plan:


D/c home today


discharge instructions reviewed


Rx for Percocet as needed


follow-up in 2 weeks





Dr. Jameson has seen and examined patient, agrees with above

## 2017-09-26 NOTE — DISCHARGE INSTRUCTIONS
Discharge Instructions


Date of Service


Sep 26, 2017.





Admission


Reason for Admission:  Appendicitis





Discharge


Discharge Diagnosis / Problem:  same





Discharge Goals


Goal(s):  Decrease discomfort, Improve function





Activity Recommendations


Activity Limitations:  as noted below


No heavy lifting over 10 pounds for 2 weeks


No strenuous activity until cleared by surgeon


Walking and light activity is encouraged


No submerging incisions underwater for 2 weeks (No bathing, swimming, or hot 

tubs)


No driving while taking narcotic pain medication or until you are pain free


.





Instructions / Follow-Up


Instructions / Follow-Up


You may shower tomorrow and remove outer dressings.  Keep steri strips on 

incisions for 7 days.  They may fall off on there own that is okay.


Follow-up with Dr. Jameson/Aleksandra Moura PA-C in 2 weeks, please call office 

at [phone] to make an appointment





Current Hospital Diet


Patient's current hospital diet: Regular Diet





Discharge Diet


Recommended Diet:  Regular Diet





Procedures


Procedures Performed:  


Laparoscopic Appendectomy





Pending Studies


Studies pending at discharge:  yes


List of pending studies:  


Pathology- will be reviewed at follow-up visit





Medical Emergencies








.


Who to Call and When:





Medical Emergencies:  If at any time you feel your situation is an emergency, 

please call 911 immediately.





.





Non-Emergent Contact


Non-Emergency issues call your:  Primary Care Provider, Surgeon


Call Non-Emergent contact if:  you have a fever, temperature is above 101.5, 

your pain is not controlled, your pain is worsening, wound has increased 

drainage, wound has increased redness, wound has increased pain


.








"Provider Documentation" section prepared by Aleksandra Moura.








.





VTE Core Measure


Inpt VTE Proph given/why not[place]  [facility] [place]


Search Results:  patient reviewed within database, no issues identified

## 2017-09-26 NOTE — ANESTHESIOLOGY PROGRESS NOTE
Anesthesia Post Op Note


Date & Time


Sep 26, 2017 at 07:30





Vital Signs





Vital Signs Past 12 Hours








  Date Time  Temp Pulse Resp B/P (MAP) Pulse Ox O2 Delivery O2 Flow Rate FiO2


 


9/26/17 07:13 36.9 72 16 122/72 (89) 98 Room Air  


 


9/26/17 03:08 36.9 77 16 127/79 (95) 98 Room Air  


 


9/25/17 23:30      Room Air  


 


9/25/17 23:05 37.1 84 16 106/66 (79) 98 Room Air  











Notes


Mental Status:  alert / awake / arousable, participated in evaluation


Pt Amnestic to Procedure:  Yes


Nausea / Vomiting:  adequately controlled


Pain:  adequately controlled


Airway Patency, RR, SpO2:  stable & adequate


BP & HR:  stable & adequate


Hydration State:  stable & adequate


Anesthetic Complications:  no major complications apparent

## 2017-09-27 NOTE — DISCHARGE SUMMARY
Discharge Summary


Dates


Admission Date / Time:  Sep 25, 2017 at 12:32


Discharge Date:  Sep 26, 2017





Dispostion / Condition


Discharge Disposition:  Home


Condition at Discharge:  Good





Principal Diagnosis





(1) Appendicitis





Problem List





(1) Appendicitis


(2) TOBACCO USE DISORDER


(3) OVARIAN CYST NEC/NOS


(4) ATTN DEFICIT W HYPERACT





Consultations / Procedures


Consultations:


None


Procedures:


Laparoscopic Appendectomy





Medication Reconciliation


New Medications:  


Oxycodone/Acetaminophen 5MG/325MG (Percocet 5MG/325MG)  Tab


1 TAB PO Q4H PRN for Moderate Pain(pain rating 4-6), #18 TAB 0 Refills


PAIN








Admission HPI


Per the Admitting provider:


This is a 31-year-old female who presented to the emergency room with a 

complaint of pain centered mostly now in the right lower quadrant.  She stated 

that yesterday she had some mild discomfort that was more of a crampy 

sensation.  She has lactose intolerance and develops that on occasion.  This 

did not feel any different than what she normally experiences.  She did have 

some nausea.  She had no vomiting until this morning.  She denies fever and 

chills.  She went to bed last night was able to sleep however awoke at 4:30 

this morning with more sharp pain that was now centered in the right lower 

quadrant that then radiated around to her back.  She has not had any diarrhea 

or constipation.  She denies melena and hematochezia.  She's had no hematuria.  

She does not think that she has had fever.





Admission Exam


Per the Admitting provider:  


   General Appearance:  WD/WN, no apparent distress


   Head:  normocephalic


   Neck:  supple


   Respiratory/Chest:  chest non-tender, lungs clear


   Cardiovascular:  regular rate, rhythm


   Abdomen/GI:  normal bowel sounds, soft, + tenderness (RLQ)


   Back:  normal inspection, no CVA tenderness


   Extremities/Musculoskelatal:  normal inspection





Hospital Course





(1) Appendicitis


Patient was taken to operating room for laparoscopic possible open appendectomy 

by Dr. Jameson.  Patient tolerated procedure well without any complications 

and was transferred to the PACU in stable condition.  She was then transferred 

to medical/surgical floor for post operative care.  She was started on oral 

Percocet and breakthrough IV Morphine as needed for pain, IV Zofran, IV fluids 

at 100 mls/hr, and regular diet.  Activity as tolerated.  POD # 1 patient was 

feeling well, pain minimal to moderate but controlled with oral pain 

medication.  No nausea or vomiting.  Tolerated regular diet.  Urinating and 

ambulating without difficulty.  Vital signs stable.  Overall hospital course 

was uncomplicated.  She was discharged home on POD # 1 in stable condition.








Discharge Instructions


as given to patient





Copies To


Primary Care Provider:  Jeffrey Bailon M.D..

## 2018-01-02 ENCOUNTER — HOSPITAL ENCOUNTER (EMERGENCY)
Dept: HOSPITAL 45 - C.EDB | Age: 33
Discharge: HOME | End: 2018-01-02
Payer: COMMERCIAL

## 2018-01-02 VITALS
TEMPERATURE: 98.24 F | HEART RATE: 79 BPM | DIASTOLIC BLOOD PRESSURE: 87 MMHG | OXYGEN SATURATION: 98 % | SYSTOLIC BLOOD PRESSURE: 134 MMHG

## 2018-01-02 VITALS
BODY MASS INDEX: 27.76 KG/M2 | HEIGHT: 69.02 IN | WEIGHT: 187.39 LBS | BODY MASS INDEX: 27.76 KG/M2 | HEIGHT: 69.02 IN | WEIGHT: 187.39 LBS

## 2018-01-02 DIAGNOSIS — S61.217A: ICD-10-CM

## 2018-01-02 DIAGNOSIS — F90.9: ICD-10-CM

## 2018-01-02 DIAGNOSIS — F17.210: ICD-10-CM

## 2018-01-02 DIAGNOSIS — L03.012: Primary | ICD-10-CM

## 2018-01-02 DIAGNOSIS — Z83.3: ICD-10-CM

## 2018-01-02 DIAGNOSIS — Z82.49: ICD-10-CM

## 2018-01-02 DIAGNOSIS — Z23: ICD-10-CM
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
## 2018-01-02 NOTE — EMERGENCY ROOM VISIT NOTE
History


First contact with patient:  19:56


Chief Complaint:  FINGER PAIN


Stated Complaint:  LEFT 5TH DIGIT SWOLLEN





History of Present Illness


The patient is a 32 year old female who presents to the Emergency Room via 

private vehicle with complaints of "left fifth digit is swollen".  The patient 

states that 2 days ago she cut her left pinky on a metal object.  She states 

she was moving objects.  She states that since then she has noticed swelling, 

redness and numbness to the left fifth digit.  She believes her tetanus is up-to

-date but is not sure.  She denies any fevers, chills, medication changes.  She 

is right-handed.  She denies chance of pregnancy.





Review of Systems


A complete 6-point Review of Systems was discussed with the patient, with 

pertinent positives and negatives listed in the History of Present Illness. All 

remaining Review of Systems questions can be considered negative unless 

otherwise specified.





Past Medical/Surgical History


Medical Problems:


(1) Active labor


(2) ACUTE BRONCHITIS


(3) ATTN DEFICIT W HYPERACT


(4) FAM HX-DIABETES MELLITUS


(5) OVARIAN CYST NEC/NOS


(6) TOBACCO USE DISORDER








Family History





Diabetes mellitus


Hypertension





Social History


Smoking Status:  Current Every Day Smoker


Alcohol Use:  none


Marital Status:  single


Housing Status:  lives with family


Occupation Status:  employed





Current/Historical Medications


Scheduled


Cephalexin Monohydrate (Keflex), 500 MG PO QID


Sulfa/Trimethoprim (Bactrim Ds 800MG/160MG), 1 TAB PO BID





Physical Exam


Vital Signs











  Date Time  Temp Pulse Resp B/P (MAP) Pulse Ox O2 Delivery O2 Flow Rate FiO2


 


1/2/18 19:49 36.8 79 20 134/87 98 Room Air  











Physical Exam


VITAL SIGNS - Vital signs and nursing notes were reviewed.  Stable.  Afebrile.


GENERAL -32-year-old female appearing her stated age who is in no acute 

distress. Communicates well with provider and answers questions appropriately.


SKIN - over lateral left fifth digit there is edema throughout the entire digit

, and 3 small wounds.  First is on the lateral aspect of the left fifth digit 

midshaft there is a three-quarter centimeter healing laceration and on the 

other side of the finger there are 2 small abrasions.  There is erythema to the 

base of the left fifth digit at the metacarpal region.  There is edema most 

pronounced between the left fifth metacarpal and PIP joint.  Excellent 

capillary refill of this digit.


EXTREMITIES - left fifth digit as described above, there is full range of 

motion of this region.  No neurovascular deficit.  +5/5 strength noted in UE/LE 

bilaterally.





Medical Decision & Procedures


ER Provider


Diagnostic Interpretation:


L FINGER(S) MIN 2 VIEWS ROUTINE





HISTORY:  32 years-old Female Left 5th digit injury acute injury of the left


fifth finger





COMPARISON: None available





TECHNIQUE: 3 views of the left fifth finger





FINDINGS: 


There is mild soft tissue swelling of the fifth finger, greatest at the level of


the proximal phalanx. No acute fracture, dislocation or significant degenerative


changes. No opaque foreign body.





IMPRESSION: Mild soft tissue swelling without fracture or dislocation. 











The above report was generated using voice recognition software. It may contain


grammatical, syntax or spelling errors.











Electronically signed by:  Royal Baird M.D.


1/2/2018 8:37 PM





Dictated Date/Time:  1/2/2018 8:36 PM





Medications Administered











 Medications


  (Trade)  Dose


 Ordered  Sig/Thomas


 Route  Start Time


 Stop Time Status Last Admin


Dose Admin


 


 Ibuprofen


  (Motrin Tab)  600 mg  NOW  STAT


 PO  1/2/18 20:02


 1/2/18 20:04 DC 1/2/18 20:02


600 MG


 


 Acetaminophen


  (Tylenol Tab)  500 mg  NOW  STAT


 PO  1/2/18 20:02


 1/2/18 20:04 DC 1/2/18 20:02


500 MG


 


 Diphtheria/


 Pertussis/Tetanus


 Vacc


  (Adacel Inj)  0.5 ml  ONCE ONCE


 IM.  1/2/18 21:00


 1/2/18 21:01 DC 1/2/18 21:00


0.5 ML


 


 Cephalexin


 Monohydrate


  (Keflex 500MG


 Home Pack)  1 homepack  NOW  STAT


 PO  1/2/18 20:58


 1/2/18 21:00 DC 1/2/18 20:58


1 HOMEPACK


 


 Trimethoprim/


 Sulfamethoxazole


  (Sulfameth/


 Trimeth Ds 800/


 160MG Home Pack)  1 homepack  UD  STAT


 PO  1/2/18 20:58


 1/2/18 21:00 DC 1/2/18 20:58


1 HOMEPACK











Medical Decision


Patient was seen and evaluated as above.  It is important to note that as soon 

as the patient was brought back to the area that I was working I was informed 

by the nurse that the patient was upset at triage.  Apparently there was an 

individual that was taken to triage before the patient over their suspected 

critical chief complaint.  The patient thought that it was racial 

discrimination as the individual that was taken in front of her was apparently 

.  I informed her that with triage, patient's are taken based upon 

their status of health, and those with more critical chief complaints have to 

be taken first.  I did however apologize for her concern and did also inform 

the nurse supervisor/charge nurse so that he could also speak with the patient 

regarding her concerns. 





 In regard to her visit, there is what I believe to be cellulitis of the left 

fifth digit.  She was promptly provided ice packs, as well as Tylenol and 

ibuprofen for her pain.  X-ray was obtained to rule out foreign body or 

fracture.  This was negative except for soft tissue swelling.  She was placed 

upon Keflex and Bactrim.  After discussing her tetanus immunization with her 

more in depth, decision was made to provide her with an Adacel immunization 

here today. Metal finger splint applied. Ring was relocated to from Left 4th 

digit (next to area of swelling) to other finger so as to not become stuck on 

finger. She is to have the wound reevaluated in 2 days or return here with 

worsening.  She was thoroughly educated upon worrisome symptoms in which to 

return, had questions answered prior to discharge, and was discharged home in 

good condition.





In the evaluation and treatment of this patient, the following differential 

diagnoses were considered:


Finger Fracture, Finger Dislocation, Finger Sprain, Finger Contusion, Jersey 

Finger, or Mallet Finger.





Impression





 Primary Impression:  


 Cellulitis





Departure Information


Dispostion


Home / Self-Care





Condition


GOOD





Prescriptions





Sulfa/Trimethoprim (Bactrim Ds 800MG/160MG)  Tab


1 TAB PO BID for 10 Days, #20 TAB


   Prov: Dwight Cho PA-C         1/2/18 


Cephalexin Monohydrate (Keflex) 500 Mg Cap


500 MG PO QID for 10 Days, #40 CAP


   Prov: Dwight Cho PA-C         1/2/18





Referrals


No Doctor, Assigned (PCP)





Patient Instructions


My [facility]





Additional Instructions





You have been treated in the Emergency Department for finger pain.





For pain control, you can use the following over-the-counter medicines (if >13 yo):





- Regular strength (325mg/tab) Tylenol (acetaminophen) 2 tabs every 4-6 hours 

as needed. Do not exceed 12 tablets in a 24 hour period. Avoid taking more than 

3 grams (3000 mg) of Tylenol per day. This includes any other sources of 

acetaminophen you may take on a regular basis.





- Regular strength (200 mg/tab) Advil (ibuprofen) 1-2 tabs every 4-6 hours as 

needed. Do not exceed a dose of 3200 mg per day.





If this is a recent injury (<24 hrs), ice can be applied to the area of pain 

for the first 3 days to help decrease pain and inflammation.





I do recommend ice to the area.





The Keflex is 500 mg every 6 hours.





Bactrim is 1 tablet every 12 hours.





These 2 antibiotics will help fight infection.  You given the first 24 hour 

supply from here with the remainder at your pharmacy for pickup.





This a total of 10 days.  You were actually given one day extra.  Please only 

take 10 days worth.





I highly recommend having this reevaluated in 2 days to ensure the infection is 

not worse.  Please return if the redness would extend, or any new/concerning 

symptoms develop.





Return to the Emergency Department if your current symptoms worsen despite 

treatment course outlined above, or if you develop any of the following symptoms

: intractable pain despite aforementioned treatment course or new onset of 

numbness or tingling of the fingers.





Thank you for your time.

## 2018-01-02 NOTE — DIAGNOSTIC IMAGING REPORT
L FINGER(S) MIN 2 VIEWS ROUTINE



HISTORY:  32 years-old Female Left 5th digit injury acute injury of the left

fifth finger



COMPARISON: None available



TECHNIQUE: 3 views of the left fifth finger



FINDINGS: 

There is mild soft tissue swelling of the fifth finger, greatest at the level of

the proximal phalanx. No acute fracture, dislocation or significant degenerative

changes. No opaque foreign body.



IMPRESSION: Mild soft tissue swelling without fracture or dislocation. 







The above report was generated using voice recognition software. It may contain

grammatical, syntax or spelling errors.







Electronically signed by:  Royal Baird M.D.

1/2/2018 8:37 PM



Dictated Date/Time:  1/2/2018 8:36 PM

## 2018-01-24 ENCOUNTER — HOSPITAL ENCOUNTER (OUTPATIENT)
Dept: HOSPITAL 45 - C.PAPS | Age: 33
Discharge: HOME | End: 2018-01-24
Attending: FAMILY MEDICINE
Payer: COMMERCIAL

## 2018-01-24 DIAGNOSIS — Z12.4: Primary | ICD-10-CM

## 2018-04-12 ENCOUNTER — HOSPITAL ENCOUNTER (EMERGENCY)
Dept: HOSPITAL 45 - C.EDB | Age: 33
Discharge: HOME | End: 2018-04-12
Payer: COMMERCIAL

## 2018-04-12 VITALS — DIASTOLIC BLOOD PRESSURE: 100 MMHG | SYSTOLIC BLOOD PRESSURE: 133 MMHG | OXYGEN SATURATION: 98 % | HEART RATE: 98 BPM

## 2018-04-12 VITALS — TEMPERATURE: 99.14 F

## 2018-04-12 DIAGNOSIS — Z91.011: ICD-10-CM

## 2018-04-12 DIAGNOSIS — Z83.3: ICD-10-CM

## 2018-04-12 DIAGNOSIS — T74.11XA: ICD-10-CM

## 2018-04-12 DIAGNOSIS — Z23: ICD-10-CM
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
## 2018-04-12 DIAGNOSIS — S08.122A: Primary | ICD-10-CM

## 2018-04-12 DIAGNOSIS — F90.9: ICD-10-CM

## 2018-04-12 DIAGNOSIS — S00.472A: ICD-10-CM
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
